# Patient Record
Sex: FEMALE | Race: WHITE | NOT HISPANIC OR LATINO | URBAN - METROPOLITAN AREA
[De-identification: names, ages, dates, MRNs, and addresses within clinical notes are randomized per-mention and may not be internally consistent; named-entity substitution may affect disease eponyms.]

---

## 2022-02-28 ENCOUNTER — APPOINTMENT (RX ONLY)
Dept: URBAN - METROPOLITAN AREA CLINIC 170 | Facility: CLINIC | Age: 57
Setting detail: DERMATOLOGY
End: 2022-02-28

## 2022-02-28 DIAGNOSIS — L20.89 OTHER ATOPIC DERMATITIS: ICD-10-CM

## 2022-02-28 DIAGNOSIS — L72.11 PILAR CYST: ICD-10-CM

## 2022-02-28 DIAGNOSIS — L65.0 TELOGEN EFFLUVIUM: ICD-10-CM

## 2022-02-28 DIAGNOSIS — B00.1 HERPESVIRAL VESICULAR DERMATITIS: ICD-10-CM

## 2022-02-28 PROBLEM — L20.84 INTRINSIC (ALLERGIC) ECZEMA: Status: ACTIVE | Noted: 2022-02-28

## 2022-02-28 PROCEDURE — 99204 OFFICE O/P NEW MOD 45 MIN: CPT

## 2022-02-28 PROCEDURE — ? PRESCRIPTION

## 2022-02-28 PROCEDURE — ? ADDITIONAL NOTES

## 2022-02-28 PROCEDURE — ? COUNSELING

## 2022-02-28 PROCEDURE — ? PRESCRIPTION MEDICATION MANAGEMENT

## 2022-02-28 PROCEDURE — ? PHOTO-DOCUMENTATION

## 2022-02-28 RX ORDER — PHARMACY COMPOUNDING ACCESSORY
EACH MISCELLANEOUS
Qty: 30 | Refills: 3 | Status: ERX | COMMUNITY
Start: 2022-02-28

## 2022-02-28 RX ORDER — VALACYCLOVIR 500 MG/1
TABLET, FILM COATED ORAL
Qty: 6 | Refills: 2 | Status: ERX | COMMUNITY
Start: 2022-02-28

## 2022-02-28 RX ORDER — TRIAMCINOLONE ACETONIDE 1 MG/G
OINTMENT TOPICAL
Qty: 80 | Refills: 1 | Status: ERX | COMMUNITY
Start: 2022-02-28

## 2022-02-28 RX ADMIN — VALACYCLOVIR: 500 TABLET, FILM COATED ORAL at 00:00

## 2022-02-28 RX ADMIN — Medication: at 00:00

## 2022-02-28 RX ADMIN — TRIAMCINOLONE ACETONIDE: 1 OINTMENT TOPICAL at 00:00

## 2022-02-28 ASSESSMENT — LOCATION SIMPLE DESCRIPTION DERM
LOCATION SIMPLE: GENITALIA
LOCATION SIMPLE: POSTERIOR SCALP
LOCATION SIMPLE: LEFT UPPER BACK
LOCATION SIMPLE: RIGHT UPPER BACK

## 2022-02-28 ASSESSMENT — LOCATION ZONE DERM
LOCATION ZONE: SCALP
LOCATION ZONE: TRUNK

## 2022-02-28 ASSESSMENT — LOCATION DETAILED DESCRIPTION DERM
LOCATION DETAILED: RIGHT LATERAL UPPER BACK
LOCATION DETAILED: GENITALIA
LOCATION DETAILED: LEFT LATERAL UPPER BACK
LOCATION DETAILED: LEFT POSTERIOR PARIETAL SCALP

## 2022-02-28 NOTE — HPI: INFECTION (HERPES SIMPLEX)
How Severe Is It?: mild
Is This A New Presentation, Or A Follow-Up?: Infection
Additional History: Patient stated she has had this for many years but has not had an outbreak. Currently now has one sore. She has used something in the past that started with a “V” but doesn’t remember name of medication. Only needed when having an outbreak and its been a really long time since she had one

## 2022-02-28 NOTE — HPI: HAIR LOSS (ALOPECIA AREATA)
How Severe Is It?: severe
Is This A New Presentation, Or A Follow-Up?: Hair Loss
Additional History: Started taking collagen a couple a weeks ago, cod liver oil and multiple vitamins. Originally started around age 19 but her hair has always come back. 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 It is smooth with no signs of hair regrowth. Shes not sure if it is due to stress? Her thumb nails have been deformed this way since a little girl.

## 2022-02-28 NOTE — HPI: NODULE (SMALL BUMP UNDERNEATH SKIN)
How Severe Is Your Nodule?: moderate
Is This A New Presentation, Or A Follow-Up?: Nodule
Additional History: Sometimes feels bigger than others.  Sometimes feels like it is changing. She seen another dermatologist years ago and they didnt seem concerned about, told her it was cosmetic. She wants to make sure its nothing serious

## 2022-02-28 NOTE — HPI: RASH
What Type Of Note Output Would You Prefer (Optional)?: Bullet Format
Is The Patient Presenting As Previously Scheduled?: Yes
How Severe Is Your Rash?: moderate
Is This A New Presentation, Or A Follow-Up?: Rash
Additional History: She has always had really dry skin, not sure if this is related. She current is using Aveeno eczema. She is getting better since started collagen supplements for hair so she thinks that is helping. The Itching goes from back flanks and wraps around under breasts. Not as bad today as it was when she made the appointment 1 month ago

## 2022-02-28 NOTE — PROCEDURE: PRESCRIPTION MEDICATION MANAGEMENT
Detail Level: Zone
Initiate Treatment: Valacyclovir 500mg twice daily for 3 days
Render In Strict Bullet Format?: No
Initiate Treatment: Triamcinolone ointment BID x 2 weeks at a time as needed when flared
Plan: If no improvement in 3 months will consider Kenalog injections. 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 Ok to use Triamcinolone lotion on scalp if patient wants
Initiate Treatment: Nutrafol or propil rx hair vitamins daily. Rogaine daily. Tofacitinib 2% cream twice daily
